# Patient Record
Sex: FEMALE | Race: BLACK OR AFRICAN AMERICAN | Employment: PART TIME | URBAN - METROPOLITAN AREA
[De-identification: names, ages, dates, MRNs, and addresses within clinical notes are randomized per-mention and may not be internally consistent; named-entity substitution may affect disease eponyms.]

---

## 2019-05-31 RX ORDER — ATROPINE SULFATE 0.1 MG/ML
0.5 INJECTION INTRAVENOUS
Status: CANCELLED | OUTPATIENT
Start: 2019-05-31 | End: 2019-06-01

## 2019-05-31 RX ORDER — SODIUM CHLORIDE 0.9 % (FLUSH) 0.9 %
5-40 SYRINGE (ML) INJECTION EVERY 8 HOURS
Status: CANCELLED | OUTPATIENT
Start: 2019-05-31

## 2019-05-31 RX ORDER — SODIUM CHLORIDE 0.9 % (FLUSH) 0.9 %
5-40 SYRINGE (ML) INJECTION AS NEEDED
Status: CANCELLED | OUTPATIENT
Start: 2019-05-31

## 2019-05-31 RX ORDER — EPINEPHRINE 0.1 MG/ML
1 INJECTION INTRACARDIAC; INTRAVENOUS
Status: CANCELLED | OUTPATIENT
Start: 2019-05-31 | End: 2019-06-01

## 2019-05-31 RX ORDER — DEXTROMETHORPHAN/PSEUDOEPHED 2.5-7.5/.8
1.2 DROPS ORAL
Status: CANCELLED | OUTPATIENT
Start: 2019-05-31

## 2019-05-31 RX ORDER — DIPHENHYDRAMINE HYDROCHLORIDE 50 MG/ML
50 INJECTION, SOLUTION INTRAMUSCULAR; INTRAVENOUS ONCE
Status: CANCELLED | OUTPATIENT
Start: 2019-05-31 | End: 2019-05-31

## 2019-05-31 NOTE — H&P
This 66year old  patient was referred by Galina Barkley. This 66year old female presents for Hepatitis. History of Present Illness:  1. Hepatitis   Onset: 4 months ago. Severity level is moderate. Indications for testing include routine lab studies. Associated symptoms include abdominal pain. Pertinent negatives include abdominal distention, ascites, bleeding, bruising, confusion, fatigue, jaundice, joint pain, nausea, pruritus, rash, sleep disturbances, vomiting, weight gain and weight loss. She says someone years ago told her she had Hep C but has never been treated. In Jan 2019 HCV RNA was 373,000 IU/mL. LFTs were WNL. Infrequent alcohol use. No illicit drug use and denies any hx of IVDA, blood transfusion or tattoos. No specific GI complaints today. No family hx liver disease. She is not sure if she has been vaccinated for A and B    PROBLEM LIST:     Problem Description Onset Date Chronic Clinical Status Notes   Screening for cervical cancer  N     Screening for breast cancer  N               PAST MEDICAL/SURGICAL HISTORY   (Detailed)    Disease/disorder Onset Date Management Date Comments   CKD       Hep C       Hypertension       GERD         GYNECOLOGIC HISTORY:  Patient is postmenopausal.       Family History  (Detailed)  Patient reports there is no relevant family history. Social History:  (Detailed)  Tobacco use reviewed. The patient is left-handed. Preferred language is Declined to specify. Tobacco use status: Moderate cigarette smoker (10-19 cigs/day). Smoking status: Heavy tobacco smoker. SMOKING STATUS  Use Status Type Smoking Status Usage Per Day Years Used Total Pack Years   yes Cigarette Heavy tobacco smoker 0.5 Packs       TOBACCO/VAPING EXPOSURE  There is passive smoke exposure. ALCOHOL  There is a history of alcohol use. Type: Wine. consumed weekly. CAFFEINE  The patient uses caffeine: coffee - 2-3 cups daily a day.             Medications (active prior to today)  Medication Instructions Start Date Stop Date Refilled Elsewhere   ranitidine 150 mg tablet take 1 tablet by oral route 2 times every day 01/30/2019 01/30/2019 N   amlodipine 10 mg tablet take 1 tablet by oral route  every day 01/30/2019 01/30/2019 N   benzonatate 200 mg capsule take 1 capsule by oral route 3 times every day as needed for cough 01/30/2019 05/06/2019  N   metoprolol succinate  mg tablet,extended release 24 hr take 1 tablet by oral route  every day 02/01/2019   N   GaviLyte-N 420 gram oral solution take as prescribed by physician 04/01/2019   N     Medication Reconciliation  Medications reconciled today.   Medication Reviewed  Adherence Medication Name Sig Desc Elsewhere Status   taking as directed ranitidine 150 mg tablet take 1 tablet by oral route 2 times every day N Verified   taking as directed metoprolol succinate  mg tablet,extended release 24 hr take 1 tablet by oral route  every day N Verified   taking as directed amlodipine 10 mg tablet take 1 tablet by oral route  every day N Verified   not taking benzonatate 200 mg capsule take 1 capsule by oral route 3 times every day as needed for cough N Verified   not currently taking GaviLyte-N 420 gram oral solution take as prescribed by physician N Verified     Medications (Added, Continued or Stopped today)  Start Date Medication Directions PRN Status PRN Reason Instruction Stop Date   01/30/2019 amlodipine 10 mg tablet take 1 tablet by oral route  every day N      01/30/2019 benzonatate 200 mg capsule take 1 capsule by oral route 3 times every day as needed for cough N   05/06/2019 04/01/2019 GaviLyte-N 420 gram oral solution take as prescribed by physician N      02/01/2019 metoprolol succinate  mg tablet,extended release 24 hr take 1 tablet by oral route  every day N      01/30/2019 ranitidine 150 mg tablet take 1 tablet by oral route 2 times every day N        Allergies:  Ingredient Reaction (Severity) Medication Name Comment   NO KNOWN ALLERGIES      Reviewed, no changes. ORDERS:  Status Lab Order Time Frame Comments   ordered follow-up visit As needed     Review of Systems  System Neg/Pos Details   Constitutional Negative Chills, Fatigue, Fever, Weight gain and Weight loss. ENMT Negative Ear infections and Nasal congestion. Respiratory Negative Chronic cough, Dyspnea and Wheezing. Cardio Negative Chest pain. GI Positive Abdominal pain, Bloating, Flatulence. GI Negative Abdominal distention, Ascites, Jaundice, Nausea and Vomiting.  Negative Dysuria. Endocrine Negative Cold intolerance and Heat intolerance. Neuro Negative Confusion, Dizziness and Headache. Psych Negative Anxiety, Depression and Sleep disturbances. Integumentary Negative Pruritus and Rash. MS Negative Back pain and Joint pain. Hema/Lymph Negative Bruising, Easy bleeding and Easy bruising. Reproductive Positive The patient is post-menopausal.   Vital Signs     Gynecologic History  Patient is postmenopausal.      Height  Time ft in cm Last Measured Height Position   11:21 AM 5.0 7.00 170.18 04/01/2019 Standing     Date/Time Temp Pulse BP MAP (Calculated) Arterial Line 1 BP (mmHg) BP Patient Position Resp SpO2 O2 Device O2 Flow Rate (L/min) Pre/Post Ductal Weight   06/03/19 1308 -- 56Abnormal  173/75 108 -- -- 17 99 % Room air -- -- --   06/03/19 1126 97.7 °F (36.5 °C) 58Abnormal  175/83 114 -- -- 16 99 % Room air -- -- 91.4 kg (201 lb 6.4 oz)       PHYSICAL EXAM:  Exam Findings Details   Constitutional Normal No acute distress. Well nourished. Well developed.    Eyes Normal General - Right: Normal, Left: Normal. Sclera - Right: Normal, Left: Normal. Pupil - Right: Normal, Left: Normal. Iris - Right: Normal, Left: Normal.   Ears Normal Inspection - Right: Normal, Left: Normal.   Nose/Mouth/Throat Normal External nose - Normal. Lips/teeth/gums - Normal.   Neck Exam Normal Inspection - Normal. Thyroid gland - Normal. Range of motion - Normal.   Respiratory Normal Inspection - Normal. Auscultation - Normal. Effort - Normal.   Cardiovascular Normal Heart rate - Regular rate. Rhythm - Regular. Murmurs - None. Abdomen * Obese. Abdomen Normal Inspection - Normal. Appliance(s) - None. Auscultation - Normal. Anterior palpation - Normal, No guarding, No rebound. No abdominal tenderness. No hepatic enlargement. No hernia. No ascites. Sutton's sign - Normal.   Skin * Body areas examined - Head/face, Neck, Abdomen. Skin Normal Inspection - Normal. Nails - Normal.   Musculoskeletal Normal Visual overview of all four extremities is normal. Gait - Normal.   Neurological Normal Level of consciousness - Normal. Memory - Normal. Cranial nerves - Cranial nerves II through XII grossly intact. Sensory - Normal. Balance & gait - Normal. Coordination - Normal. Hand dominance - Left-handed. Psychiatric Normal Orientation - Oriented to time, place, person & situation. No agitation. Appropriate mood and affect. Behavior is appropriate for age. Assessment/Plan  # Detail Type Description    1. Assessment Chronic viral hepatitis C (B18.2). Patient Plan Chronic active Hepatitis C, treatment naive. Genotype unknown. Reviewed contact precautions. Discussed treatment options and she is interested in pursuing treatment. She would need further workup first including genotype, U/S, Fibrosure, Hep A and B testing, BAL/Drug screen (required by [employer] prior to treatment),     She has already established with Dr. Patrica Villegas office and we discussed that she doesn't need two different gastroenterologists. She prefers to pursue further workup and treatment with them since it is closer to her home. Plan Orders She is to schedule a follow-up visit As needed. 2. Assessment Encounter for screening for malignant neoplasm of colon (Z12.11).     Patient Plan Scheduled for screening colonoscopy with Dr. Gissel Hodge 6/2/19          No change in H&P

## 2019-06-03 ENCOUNTER — HOSPITAL ENCOUNTER (OUTPATIENT)
Age: 64
Setting detail: OUTPATIENT SURGERY
Discharge: HOME | End: 2019-06-03
Attending: INTERNAL MEDICINE | Admitting: INTERNAL MEDICINE
Payer: COMMERCIAL

## 2019-06-03 VITALS
WEIGHT: 201.4 LBS | DIASTOLIC BLOOD PRESSURE: 70 MMHG | TEMPERATURE: 97 F | SYSTOLIC BLOOD PRESSURE: 158 MMHG | HEIGHT: 67 IN | RESPIRATION RATE: 16 BRPM | HEART RATE: 62 BPM | OXYGEN SATURATION: 94 % | BODY MASS INDEX: 31.61 KG/M2

## 2019-06-03 PROCEDURE — 99153 MOD SED SAME PHYS/QHP EA: CPT | Performed by: INTERNAL MEDICINE

## 2019-06-03 PROCEDURE — 77030032490 HC SLV COMPR SCD KNE COVD -B: Performed by: INTERNAL MEDICINE

## 2019-06-03 PROCEDURE — 76040000008: Performed by: INTERNAL MEDICINE

## 2019-06-03 PROCEDURE — 74011250636 HC RX REV CODE- 250/636: Performed by: INTERNAL MEDICINE

## 2019-06-03 PROCEDURE — G0500 MOD SEDAT ENDO SERVICE >5YRS: HCPCS | Performed by: INTERNAL MEDICINE

## 2019-06-03 PROCEDURE — 74011250636 HC RX REV CODE- 250/636

## 2019-06-03 PROCEDURE — 77030020256 HC SOL INJ NACL 0.9%  500ML: Performed by: INTERNAL MEDICINE

## 2019-06-03 RX ORDER — MIDAZOLAM HYDROCHLORIDE 1 MG/ML
.25-5 INJECTION, SOLUTION INTRAMUSCULAR; INTRAVENOUS
Status: DISCONTINUED | OUTPATIENT
Start: 2019-06-03 | End: 2019-06-03 | Stop reason: HOSPADM

## 2019-06-03 RX ORDER — FLUMAZENIL 0.1 MG/ML
0.2 INJECTION INTRAVENOUS
Status: DISCONTINUED | OUTPATIENT
Start: 2019-06-03 | End: 2019-06-03 | Stop reason: HOSPADM

## 2019-06-03 RX ORDER — DIPHENHYDRAMINE HYDROCHLORIDE 50 MG/ML
INJECTION, SOLUTION INTRAMUSCULAR; INTRAVENOUS AS NEEDED
Status: DISCONTINUED | OUTPATIENT
Start: 2019-06-03 | End: 2019-06-03 | Stop reason: HOSPADM

## 2019-06-03 RX ORDER — FENTANYL CITRATE 50 UG/ML
100 INJECTION, SOLUTION INTRAMUSCULAR; INTRAVENOUS
Status: DISCONTINUED | OUTPATIENT
Start: 2019-06-03 | End: 2019-06-03 | Stop reason: HOSPADM

## 2019-06-03 RX ORDER — SODIUM CHLORIDE 9 MG/ML
1000 INJECTION, SOLUTION INTRAVENOUS CONTINUOUS
Status: DISCONTINUED | OUTPATIENT
Start: 2019-06-03 | End: 2019-06-03 | Stop reason: HOSPADM

## 2019-06-03 RX ORDER — NALOXONE HYDROCHLORIDE 0.4 MG/ML
0.4 INJECTION, SOLUTION INTRAMUSCULAR; INTRAVENOUS; SUBCUTANEOUS
Status: DISCONTINUED | OUTPATIENT
Start: 2019-06-03 | End: 2019-06-03 | Stop reason: HOSPADM

## 2019-06-03 RX ADMIN — SODIUM CHLORIDE 1000 ML: 900 INJECTION, SOLUTION INTRAVENOUS at 11:33

## 2019-06-03 NOTE — PROCEDURES
[facility]  Colonoscopy Procedure Report  _______________________________________________________  Patient: Ildefonso Yoo                                         Attending Physician: Angel Smith MD    Patient ID: [id number]                                      Referring Physician: Eliza Miller DO    Exam Date: Yolanda 3, 2019 _________________________________________________  Introduction: A  66 y.o. female patient, presents for outpatient Colonoscopy    Indications: Screen colon cancer, average risk and asymptomatic. She has one bm every 2 days. This her first colonoscopy. Consent: The benefits, risks, and alternatives to the procedure were discussed and informed consent was obtained from the patient. Preparation: EKG, pulse, pulse oximetry and blood pressure were monitored throughout the procedure. ASA Classification: Class 2 - . The heart is an S1-S2 and regular heart rate and rhythm. Lungs are clear to auscultation and percussion. Abdomen is soft, nondistended, and nontender. Mental Status: awake, alert, and oriented to person, place, and time    Medications:  · Fentanyl 200 mcg IV, Versed 15 mg IV and Benadryl 50 mg IV throughout the procedure. Rectal Exam: Normal Rectal Exam. No Blood. Pathology Specimens: No specimens removed. Procedure: The colonoscope was passed with extreme difficulty through the anus under direct visualization and advanced to the cecum. The patient required positioning on the back and serious external counter pressure to aid in the passage of the scope. The scope was withdrawn and the mucosa was carefully examined. The quality of the preparation was very good. The views were excellent. The patient's toleration of the procedure was poor. Retroflexion was preformed in the ascending colon and hepatic flexure. Total time is 36 minutes and withdrawal time is 6 minutes.     Findings:    Rectum:   Normal  Sigmoid:   Exteremly tortuous and fixed sigmoid colon from serious adhesions with severe diverticulosis. Descending Colon:   Normal  Transverse Colon:   Normal  Ascending Colon:   Normal  Cecum:   Normal  Terminal Ileum:   Normal      Unplanned Events: There were no unplanned events. Estimated Blood Loss: None  Impressions:    Extremely difficult colonoscopy caused by a very tortuous and fixed sigmoid colon from serious adhesions with severe diverticulosis. High tolerance to sedation. Normal Mucosa. No polyps found. Complications: None; patient tolerated the procedure well. Recommendations:  · Discharge home when standard parameters are met. · Resume a high fiber diet. · Colonoscopy recommendation in 10 years with the pediatric colonoscope.     Procedure Codes:    · COLONOSCOPY [FCI1500 (Type: Custom)]  Endoscope Information:  Model Number(s)    [id number]   Assistant: None  Signed By: Sidra Sesay MD Date: Yolanda 3, 2019

## 2019-06-03 NOTE — DISCHARGE INSTRUCTIONS
Adelaida Estrada  [id number]  1955    COLON DISCHARGE INSTRUCTIONS    Discomfort:  Redness at IV site- apply warm compress to area; if redness or soreness persist- contact your physician  There may be a slight amount of blood passed from the rectum  Gaseous discomfort- walking, belching will help relieve any discomfort  You may not operate a vehicle til the next day. You may not engage in an occupation involving machinery or appliances til the next day. You may not drink alcoholic beverages til the next day. DIET:   High fiber diet. ACTIVITY:  You may not  resume your normal daily activities til the next day. it is recommended that you spend the remainder of the day resting -  avoid any strenuous activity. CALL M.D.  IF ANY SIGN OF:   Increasing pain, nausea, vomiting  Abdominal distension (swelling)  New increased bleeding (oral or rectal)  Fever (chills)  Pain in chest area  Bloody discharge from nose or mouth  Shortness of breath    You may  take any Advil, Aspirin, Ibuprofen, Motrin, Aleve, or Goodys but preferably Tylenol as needed for pain. Post procedure diagnosis:  SIGMOID DIVERTICULOSIS; TORTUOUS COLON; FIXED; Follow-up Instructions: Your follow up colonoscopy will be in 10 years. Ankita Burnette MD  Yolanda 3, 2019       DISCHARGE SUMMARY from Nurse    PATIENT INSTRUCTIONS:    After general anesthesia or intravenous sedation, for 24 hours or while taking prescription Narcotics:  · Limit your activities  · Do not drive and operate hazardous machinery  · Do not make important personal or business decisions  · Do  not drink alcoholic beverages  · If you have not urinated within 8 hours after discharge, please contact your surgeon on call.     Report the following to your surgeon:  · Excessive pain, swelling, redness or odor of or around the surgical area  · Temperature over 100.5  · Nausea and vomiting lasting longer than 4 hours or if unable to take medications  · Any signs of decreased circulation or nerve impairment to extremity: change in color, persistent  numbness, tingling, coldness or increase pain  · Any questions    What to do at Home:  Recommended activity: as above,     If you experience any of the following symptoms as above, please follow up with Dr. Maribell Moss. *  Please give a list of your current medications to your Primary Care Provider. *  Please update this list whenever your medications are discontinued, doses are      changed, or new medications (including over-the-counter products) are added. *  Please carry medication information at all times in case of emergency situations. These are general instructions for a healthy lifestyle:    No smoking/ No tobacco products/ Avoid exposure to second hand smoke  Surgeon General's Warning:  Quitting smoking now greatly reduces serious risk to your health. Obesity, smoking, and sedentary lifestyle greatly increases your risk for illness    A healthy diet, regular physical exercise & weight monitoring are important for maintaining a healthy lifestyle    You may be retaining fluid if you have a history of heart failure or if you experience any of the following symptoms:  Weight gain of 3 pounds or more overnight or 5 pounds in a week, increased swelling in our hands or feet or shortness of breath while lying flat in bed. Please call your doctor as soon as you notice any of these symptoms; do not wait until your next office visit. Recognize signs and symptoms of STROKE:    F-face looks uneven    A-arms unable to move or move unevenly    S-speech slurred or non-existent    T-time-call 911 as soon as signs and symptoms begin-DO NOT go       Back to bed or wait to see if you get better-TIME IS BRAIN. Warning Signs of HEART ATTACK     Call 911 if you have these symptoms:   Chest discomfort.  Most heart attacks involve discomfort in the center of the chest that lasts more than a few minutes, or that goes away and comes back. It can feel like uncomfortable pressure, squeezing, fullness, or pain.  Discomfort in other areas of the upper body. Symptoms can include pain or discomfort in one or both arms, the back, neck, jaw, or stomach.  Shortness of breath with or without chest discomfort.  Other signs may include breaking out in a cold sweat, nausea, or lightheadedness. Don't wait more than five minutes to call 911 - MINUTES MATTER! Fast action can save your life. Calling 911 is almost always the fastest way to get lifesaving treatment. Emergency Medical Services staff can begin treatment when they arrive -- up to an hour sooner than if someone gets to the hospital by car. The discharge information has been reviewed with the patient. The patient verbalized understanding. Discharge medications reviewed with the patient and appropriate educational materials and side effects teaching were provided.   ___________________________________________________________________________________________________________________________________    Patient armband removed and shredded

## 2020-07-06 ENCOUNTER — HOSPITAL ENCOUNTER (OUTPATIENT)
Dept: PREADMISSION TESTING | Age: 65
Discharge: HOME | End: 2020-07-06
Payer: COMMERCIAL

## 2020-07-06 PROCEDURE — 87635 SARS-COV-2 COVID-19 AMP PRB: CPT

## 2020-07-07 LAB — SARS-COV-2, COV2NT: NOT DETECTED

## 2020-07-07 RX ORDER — ATROPINE SULFATE 0.1 MG/ML
0.5 INJECTION INTRAVENOUS
Status: CANCELLED | OUTPATIENT
Start: 2020-07-07 | End: 2020-07-08

## 2020-07-07 RX ORDER — EPINEPHRINE 0.1 MG/ML
1 INJECTION INTRACARDIAC; INTRAVENOUS
Status: CANCELLED | OUTPATIENT
Start: 2020-07-07 | End: 2020-07-08

## 2020-07-07 RX ORDER — DEXTROMETHORPHAN/PSEUDOEPHED 2.5-7.5/.8
1.2 DROPS ORAL
Status: CANCELLED | OUTPATIENT
Start: 2020-07-07

## 2020-07-07 RX ORDER — SODIUM CHLORIDE 0.9 % (FLUSH) 0.9 %
5-40 SYRINGE (ML) INJECTION EVERY 8 HOURS
Status: CANCELLED | OUTPATIENT
Start: 2020-07-07

## 2020-07-07 RX ORDER — SODIUM CHLORIDE 0.9 % (FLUSH) 0.9 %
5-40 SYRINGE (ML) INJECTION AS NEEDED
Status: CANCELLED | OUTPATIENT
Start: 2020-07-07

## 2020-07-07 RX ORDER — DIPHENHYDRAMINE HYDROCHLORIDE 50 MG/ML
50 INJECTION, SOLUTION INTRAMUSCULAR; INTRAVENOUS ONCE
Status: CANCELLED | OUTPATIENT
Start: 2020-07-07 | End: 2020-07-07

## 2020-07-10 ENCOUNTER — HOSPITAL ENCOUNTER (OUTPATIENT)
Age: 65
Setting detail: OUTPATIENT SURGERY
Discharge: HOME | End: 2020-07-10
Attending: INTERNAL MEDICINE | Admitting: INTERNAL MEDICINE
Payer: COMMERCIAL

## 2020-07-10 VITALS
RESPIRATION RATE: 16 BRPM | HEART RATE: 60 BPM | WEIGHT: 217 LBS | OXYGEN SATURATION: 96 % | DIASTOLIC BLOOD PRESSURE: 94 MMHG | TEMPERATURE: 98.1 F | HEIGHT: 67 IN | BODY MASS INDEX: 34.06 KG/M2 | SYSTOLIC BLOOD PRESSURE: 157 MMHG

## 2020-07-10 PROCEDURE — 76040000019: Performed by: INTERNAL MEDICINE

## 2020-07-10 PROCEDURE — G0500 MOD SEDAT ENDO SERVICE >5YRS: HCPCS | Performed by: INTERNAL MEDICINE

## 2020-07-10 PROCEDURE — 77030040361 HC SLV COMPR DVT MDII -B: Performed by: INTERNAL MEDICINE

## 2020-07-10 PROCEDURE — 74011250636 HC RX REV CODE- 250/636: Performed by: INTERNAL MEDICINE

## 2020-07-10 RX ORDER — NALOXONE HYDROCHLORIDE 0.4 MG/ML
0.4 INJECTION, SOLUTION INTRAMUSCULAR; INTRAVENOUS; SUBCUTANEOUS
Status: DISCONTINUED | OUTPATIENT
Start: 2020-07-10 | End: 2020-07-10 | Stop reason: HOSPADM

## 2020-07-10 RX ORDER — FLUMAZENIL 0.1 MG/ML
0.2 INJECTION INTRAVENOUS
Status: DISCONTINUED | OUTPATIENT
Start: 2020-07-10 | End: 2020-07-10 | Stop reason: HOSPADM

## 2020-07-10 RX ORDER — FENTANYL CITRATE 50 UG/ML
100 INJECTION, SOLUTION INTRAMUSCULAR; INTRAVENOUS
Status: DISCONTINUED | OUTPATIENT
Start: 2020-07-10 | End: 2020-07-10 | Stop reason: HOSPADM

## 2020-07-10 RX ORDER — MIDAZOLAM HYDROCHLORIDE 1 MG/ML
.25-5 INJECTION, SOLUTION INTRAMUSCULAR; INTRAVENOUS
Status: DISCONTINUED | OUTPATIENT
Start: 2020-07-10 | End: 2020-07-10 | Stop reason: HOSPADM

## 2020-07-10 RX ORDER — SODIUM CHLORIDE 9 MG/ML
1000 INJECTION, SOLUTION INTRAVENOUS CONTINUOUS
Status: DISCONTINUED | OUTPATIENT
Start: 2020-07-10 | End: 2020-07-10 | Stop reason: HOSPADM

## 2020-07-10 RX ADMIN — SODIUM CHLORIDE 1000 ML: 900 INJECTION, SOLUTION INTRAVENOUS at 08:50

## 2020-07-10 NOTE — H&P
Assessment/Plan  # Detail Type Description    1. Assessment Chronic viral hepatitis C (B18.2). Patient Plan 60 yr old female patient of Dr. Sandy seen for follow up chronic viral hepatitis C  She has been treated with [address] but finished it about 6 months ago. no verification for eradication as she missed an appointment. she got already her vaccines for twinrix. need to check HCV RNA. last US 8/ 2019 liver ok. LFT has been always normal    Plan Orders HCV RT-PCR, Quant (Non-Graph) to be performed., Hep A Ab, Total to be performed., Hep B Surface Ab to be performed., Iron and TIBC to be performed. and Occult Blood, Fecal, IA to be performed. 2. Assessment GERD w/o esophagitis (K21.9). Patient Plan taking simple Zantac with some control. need to check her stomach with mild Iron deficiency anemia  . she already had an extremely difficult colonoscopy 9/10/ 2019 extremely tortuous and stiff sigmoid with severe diverticulosis no polyps. next colon in 2029  she has one bm daily no blood or melena         3. Assessment Smoker (F17.200). Patient Plan 1/2 pa         4. Assessment HTN w/ CKD stage 3 (I12.9). Patient Plan creatinine 1.5 with right renal atrophy followed by Nephrology Mounika Lose  still smoking 1/2 pack per day and gaining weight told her she has to quit and loose weight. / 121 very high. has to take her medication         5. Assessment Iron deficiency anemia, unspecified (D50.9). Patient Plan stable on Yolanda 10, 2020 hgb11.9 but with very low MCV and MCH suggesting iron deficiency. I think we need to check her stomach  she needs to loose weight         6. Assessment Body mass index (BMI) 34.0-34.9, adult (Z68.34). Patient Plan  High BMI 34.7. I encouraged her to continue watching her diet and exercising. This 60year old female presents for follow up of Hepatitis C. History of Present Illness:  1.   Follow Up of Hepatitis C       PROBLEM LIST:     Problem Description Onset Date Chronic Clinical Status Notes   Irritable bowel syndrome characterized by constipation 06/05/2019 N     Chronic kidney disease due to hypertension 06/05/2019 N     Liver function tests abnormal 06/05/2019 N     Chronic hepatitis C 06/05/2019 N     Screening for cervical cancer  N     Screening for breast cancer  N     Obstipation 09/11/2019 N     Gastroesophageal reflux in child 09/11/2019 N     Body mass index 30+ - obesity 09/11/2019 N     Maternal tobacco use in pregnancy 09/11/2019 N               Medications (active prior to today)  Medication Name Sig Description Start Date Stop Date Refilled Rx Elsewhere   Twinrix (PF) [address] unit-20 mcg/mL intramuscular syringe intra deltoid injection at 0, 1 and 6 months 07/01/2019   N   Norvasc 5 mg tablet take 1 tablet by oral route  every bedtime 09/18/2019   N   cyclobenzaprine 10 mg tablet take 1 tablet by oral route 3 times every day prn spasm 02/24/2020   N   METOPROLOL SUCCINATE  MG TABLET,EXTENDED RELEASE 24 HR take 1 tablet by oral route every day 05/19/2020 05/19/2020 N   FAMOTIDINE 40 MG TABLET take 1 tablet by oral route every day at bedtime 05/19/2020 05/19/2020 N   Victoza 3-Nino 0.6 mg/0.1 mL (18 mg/3 mL) subcutaneous pen injector inject 0.3 milliliter by subcutaneous route  every day (0.3 ml = 1.8 mg) 06/24/2020   N     Patient Status   Completed with information received for patient in a summary of care record. Medication Reconciliation  Medications reconciled today.   Medication Reviewed  Adherence Medication Name Sig Desc Elsewhere Status   taking as directed Twinrix (PF) [address] unit-20 mcg/mL intramuscular syringe intra deltoid injection at 0, 1 and 6 months N Verified   taking as directed Norvasc 5 mg tablet take 1 tablet by oral route  every bedtime N Verified   taking as directed cyclobenzaprine 10 mg tablet take 1 tablet by oral route 3 times every day prn spasm N Verified   taking as directed METOPROLOL SUCCINATE  MG TABLET,EXTENDED RELEASE 24 HR take 1 tablet by oral route every day N Verified   taking as directed FAMOTIDINE 40 MG TABLET take 1 tablet by oral route every day at bedtime N Verified   taking as directed Victoza 3-Nino 0.6 mg/0.1 mL (18 mg/3 mL) subcutaneous pen injector inject 0.3 milliliter by subcutaneous route  every day (0.3 ml = 1.8 mg) N Verified     Medications (Added, Continued or Stopped today)  Start Date Medication Directions PRN Status PRN Reason Instruction Stop Date   02/24/2020 cyclobenzaprine 10 mg tablet take 1 tablet by oral route 3 times every day prn spasm N      05/19/2020 FAMOTIDINE 40 MG TABLET take 1 tablet by oral route every day at bedtime N      05/19/2020 METOPROLOL SUCCINATE  MG TABLET,EXTENDED RELEASE 24 HR take 1 tablet by oral route every day N      09/18/2019 Norvasc 5 mg tablet take 1 tablet by oral route  every bedtime N      07/01/2019 Twinrix (PF) 720 [address]SA unit-20 mcg/mL intramuscular syringe intra deltoid injection at 0, 1 and 6 months N      06/24/2020 Victoza 3-Nino 0.6 mg/0.1 mL (18 mg/3 mL) subcutaneous pen injector inject 0.3 milliliter by subcutaneous route  every day (0.3 ml = 1.8 mg) N        Allergies:  Ingredient Reaction (Severity) Medication Name Comment   NO KNOWN ALLERGIES          ORDERS:  Status Lab Order Time Frame Comments   ordered Occult Blood, Fecal, IA     ordered Iron and TIBC     ordered Hep A Ab, Total     ordered Hep B Surface Ab     ordered HCV RT-PCR, Quant (Non-Graph)       Review of System  System Neg/Pos Details   Constitutional Negative Chills, Fever, Malaise and Weight loss. ENMT Negative Ear infections, Nasal congestion, Sinus Infection and Sore throat. Eyes Negative Double vision and Eye pain. Respiratory Negative Asthma, Chronic cough, Dyspnea, Pleuritic pain and Wheezing. Cardio Negative Chest pain, Edema and Irregular heartbeat/palpitations.    GI Negative Abdominal pain, Change in bowel habits, Constipation, Decreased appetite, Diarrhea, Dysphagia, Heartburn, Hematemesis, Hematochezia, Melena, Nausea, Reflux and Vomiting.  Negative Dysuria, Hematuria, Urinary frequency, Urinary incontinence and Urinary retention. Endocrine Negative Cold intolerance, Heat intolerance and Increased thirst.   Neuro Negative Dizziness, Headache, Numbness, Tremors and Vertigo. Psych Negative Anxiety, Depression and Increased stress. Integumentary Negative Hives, Pruritus and Rash. MS Negative Back pain, Joint pain and Myalgia. Hema/Lymph Negative Easy bleeding, Easy bruising and Lymphadenopathy. Allergic/Immuno Negative Chemicals in work place, Contact allergy, Food allergies, Immunosuppression and Seasonal allergies. Reproductive Negative Breast lumps, Breast pain and Vaginal discharge. Vital Signs   Gynecologic History  Patient is postmenopausal.    Height  Time ft in cm Last Measured Height Position   10:48 AM 5.0 7.00 170.18 10/16/2019 Standing     Date/Time  Temp  Pulse  BP  MAP (Calculated)  Arterial Line 1 BP (mmHg)  BP Patient Position  Resp  SpO2  O2 Device  O2 Flow Rate (L/min)  Pre/Post Ductal  Weight    07/10/20 0836    66  189/93Abnormal    125                    07/10/20 0820  97.6 °F (36.4 °C)  66  190/100Abnormal    130      16  96 %  Room air      98.4 kg (217 lb)          PHYSICAL EXAM:  Exam Findings Details   Constitutional Normal Well developed. Eyes Normal Conjunctiva - Right: Normal, Left: Normal. Sclera - Right: Normal, Left: Normal.   Nasopharynx Normal Lips/teeth/gums - Normal. Buccal mucosa - Normal.   Neck Exam Normal Inspection - Normal. Palpation - Normal. Thyroid gland - Normal.   Respiratory Normal Inspection - Normal. Auscultation - Normal.   Cardiovascular Normal Regular rate and rhythm. No murmurs, gallops, or rubs.    Abdomen Normal Inspection - Normal. Appliance(s) - Normal. Abdominal muscles - Normal. Auscultation - Normal. Percussion - Normal. Anterior palpation - Normal, No guarding. Umbilicus - Normal. Abdominal reflxes - Normal. No abdominal tenderness. No hepatic enlargement. No spleen enlargement. No hepatojugular reflux. No hernia. No Ascites. Sutton's sign - Negative. No hepatic tenderness. No hepatic bruit. Genitourinary Female * Rectal deferred. Genitourinary Female Normal No suprapubic tenderness. No CVA tenderness. No flank mass. Skin Normal Inspection - Normal.   Musculoskeletal Normal Hands/Wrist - Right: Normal, Left: Normal.   Extremity Normal No edema. Neurological Normal Fine motor skills - Normal.   Psychiatric Normal Orientation - Oriented to time, place, person & situation. Appropriate mood and affect.      No change in H&P

## 2020-07-10 NOTE — DISCHARGE INSTRUCTIONS
Fatimah Haji  [id number]  1955    EGD DISCHARGE INSTRUCTIONS  Discomfort:  Sore throat- throat lozenges or warm salt water gargle  redness at IV site- apply warm compress to area; if redness or soreness persist- contact your physician  Gaseous discomfort- walking, belching will help relieve any discomfort  You may not operate a vehicle until the next day  You may not engage in an occupation involving machinery or appliances until the next day  You may not drink alcoholic beverages until the next day  Avoid making any critical decisions for at least 24 hour    DIET   You may not resume your regular diet. Antireflux diet. ACTIVITY  You may not resume your normal daily activities   Spend the remainder of the day resting -  avoid any strenuous activity. CALL M.D. ANY SIGN OF   Increasing pain, nausea, vomiting  Abdominal distension (swelling)  New increased bleeding (oral or rectal)  Fever (chills)  Pain in chest area  Bloody discharge from nose or mouth  Shortness of breath     You may not take any Advil, Aspirin, Ibuprofen, Motrin, Aleve, or Goodys better to takeTylenol as needed for pain. Follow-up Instructions: Follow-up in the office as scheduled or make a follow-up appointment in 2 weeks. Brandie North MD  July 10, 2020       DISCHARGE SUMMARY from Nurse    PATIENT INSTRUCTIONS:    After general anesthesia or intravenous sedation, for 24 hours or while taking prescription Narcotics:  · Limit your activities  · Do not drive and operate hazardous machinery  · Do not make important personal or business decisions  · Do  not drink alcoholic beverages  · If you have not urinated within 8 hours after discharge, please contact your surgeon on call.     Report the following to your surgeon:  · Excessive pain, swelling, redness or odor of or around the surgical area  · Temperature over 100.5  · Nausea and vomiting lasting longer than 4 hours or if unable to take medications  · Any signs of decreased circulation or nerve impairment to extremity: change in color, persistent  numbness, tingling, coldness or increase pain  · Any questions    What to do at Home:  Recommended activity: as above,     If you experience any of the following symptoms as above, please follow up with Dr. Yasmin Paredes. *  Please give a list of your current medications to your Primary Care Provider. *  Please update this list whenever your medications are discontinued, doses are      changed, or new medications (including over-the-counter products) are added. *  Please carry medication information at all times in case of emergency situations. These are general instructions for a healthy lifestyle:    No smoking/ No tobacco products/ Avoid exposure to second hand smoke  Surgeon General's Warning:  Quitting smoking now greatly reduces serious risk to your health. Obesity, smoking, and sedentary lifestyle greatly increases your risk for illness    A healthy diet, regular physical exercise & weight monitoring are important for maintaining a healthy lifestyle    You may be retaining fluid if you have a history of heart failure or if you experience any of the following symptoms:  Weight gain of 3 pounds or more overnight or 5 pounds in a week, increased swelling in our hands or feet or shortness of breath while lying flat in bed. Please call your doctor as soon as you notice any of these symptoms; do not wait until your next office visit. The discharge information has been reviewed with the patient. The patient and spouse verbalized understanding. Discharge medications reviewed with the patient and appropriate educational materials and side effects teaching were provided.   ___________________________________________________________________________________________________________________________________  Patient armband removed and shredded

## 2020-07-10 NOTE — PROCEDURES
(EGD) Esophagogastroduodenoscopy (UPPER ENDOSCOPY) Procedure Note  [facility] [place]  __________________________________________________________________________________________________________________________      7/10/2020     Patient: Jason Mcgovern YOB: 1955 Gender: female Age: 60 y.o. INDICATION: patient of Dr. Sandy seen for follow up chronic viral hepatitis C. She has been treated with Maveryt but finished it about 6 months ago. no verification for eradication as she missed an appointment. she got already her vaccines for twinrix. HCV RNA is now negative which is good. last US 8/ 2019 liver ok. LFT has been always normal. Chronic GERD treated with Zantac with some control. need to check her stomach with mild Iron deficiency anemia Hgb 11.9 on June 9, 2020. She already had an extremely difficult colonoscopy 9/10/ 2019 extremely tortuous and stiff sigmoid with severe diverticulosis no polyps. next colon in 2029. She has one bm daily no blood or melena. : Tim Figueroa MD    Referring Provider: Colie Castleman, DO    Sedation:  Versed 5 mg IV, Fentanyl 100 mcg IV,  Procedure Details:  After infomed consent was obtained for the procedure, with all risks and benefits of procedure explained to the family the patient was taken to the endoscopy suite and placed in the left lateral decubitus position. Following sequential administration of sedation as per above, the endoscope was inserted into the mouth and advanced under direct vision to third portion of the duodenum. A careful inspection was made as the gastroscope was withdrawn, including a retroflexed view of the proximal stomach; findings and interventions are described below. EGD  OROPHARYNX: The vocal Cords are very floppy has wing like appearance   the larynx and pyriform recesses normal.   ESOPHAGUS: The proximal, mid, and distal oesophagus are normal. The Z-Line is intact.  2 cm  Hiatal hernia between 40 and 42 cm. No varices  STOMACH: The fundus on antegrade and retroflex views is normal. The cardia, body, lesser curvature, greater curvature, the antrum, and pylorus are normal. No portal hypertensive gastropathy and nothing to explain anemia  DUODENUM: The bulb, second, third, fourth portions and major papilla are normal.  Therapies:  Nil    Specimen: none           Complications:   None    EBL:  Nil. IMPRESSION: Vocal cords are very floppy wing like causing obstructive airway. 2 cm hiatal hernia. Nothing was found to explaine the mildiron deficiency anemia          RECOMMENDATION:  may resume antireflux diet. Avoid NSAID's. Make a FU appointment at the office. continue taking Ranitidine but preferably Famotidine 20 mg as needed. She needs consultation with ENT for her vocal cords problem. Needs regular monitoring of her Hgb/ HCt every 6 months and occult blood in the stools.  Avoid all NSAID's  Assistant: None    --Dante Berkowitz MD on 7/10/2020 at 9:44 AM

## (undated) DEVICE — KENDALL RADIOLUCENT FOAM MONITORING ELECTRODE RECTANGULAR SHAPE: Brand: KENDALL

## (undated) DEVICE — MEDI-VAC NON-CONDUCTIVE SUCTION TUBING: Brand: CARDINAL HEALTH

## (undated) DEVICE — WRISTBAND ID AD W2.5XL9.5CM RED VYN ADH CLSR UNI-PRINT

## (undated) DEVICE — KENDALL SCD EXPRESS SLEEVES, KNEE LENGTH, MEDIUM: Brand: KENDALL SCD

## (undated) DEVICE — TRNQT TEXT 1X18IN BLU LF DISP -- CONVERT TO ITEM 362165

## (undated) DEVICE — NDL FLTR TIP 5 MIC 18GX1.5IN --

## (undated) DEVICE — ENDO CARRY-ON PROCEDURE KIT INCLUDES ENZYMATIC SPONGE, GAUZE, BIOHAZARD LABEL, TRAY, LUBRICANT, DIRTY SCOPE LABEL, WATER LABEL, TRAY, DRAWSTRING PAD, AND DEFENDO 4-PIECE KIT.: Brand: ENDO CARRY-ON PROCEDURE KIT

## (undated) DEVICE — SPONGE GZ W4XL4IN COT 12 PLY TYP VII WVN C FLD DSGN

## (undated) DEVICE — SINGLE PORT MANIFOLD: Brand: NEPTUNE 2

## (undated) DEVICE — TRAP SPEC COLL POLYP POLYSTYR --

## (undated) DEVICE — REM POLYHESIVE ADULT PATIENT RETURN ELECTRODE: Brand: VALLEYLAB

## (undated) DEVICE — CATH SUC CTRL PRT TRIFLO 14FR --

## (undated) DEVICE — MAJ-1414 SINGLE USE ADPATER BIOPSY VALV: Brand: SINGLE USE ADAPTOR BIOPSY VALVE

## (undated) DEVICE — GARMENT,MEDLINE,DVT,INT,CALF,MED, GEN2: Brand: MEDLINE

## (undated) DEVICE — SYRINGE 50ML E/T

## (undated) DEVICE — SOLUTION IV 500ML 0.9% SOD CHL FLX CONT

## (undated) DEVICE — NDL PRT INJ NSAF BLNT 18GX1.5 --

## (undated) DEVICE — CATH IV SAFE STR 22GX1IN BLU -- PROTECTIV PLUS

## (undated) DEVICE — SYR 5ML 1/5 GRAD LL NSAF LF --

## (undated) DEVICE — MOUTHPIECE ENDOSCP 20X27MM --

## (undated) DEVICE — SET ADMIN 16ML TBNG L100IN 2 Y INJ SITE IV PIGGY BK DISP

## (undated) DEVICE — SYR 3ML LL TIP 1/10ML GRAD --

## (undated) DEVICE — CANNULA CUSH AD W/ 14FT TBG

## (undated) DEVICE — GAUZE SPNG AVANT 4X4 4PLY NS --